# Patient Record
Sex: MALE | Race: WHITE | NOT HISPANIC OR LATINO | Employment: FULL TIME | URBAN - METROPOLITAN AREA
[De-identification: names, ages, dates, MRNs, and addresses within clinical notes are randomized per-mention and may not be internally consistent; named-entity substitution may affect disease eponyms.]

---

## 2019-01-24 ENCOUNTER — HISTORICAL (OUTPATIENT)
Dept: ADMINISTRATIVE | Facility: HOSPITAL | Age: 39
End: 2019-01-24

## 2019-01-24 LAB
ABS NEUT (OLG): 4.2 X10(3)/MCL (ref 2.1–9.2)
ALBUMIN SERPL-MCNC: 4.7 GM/DL (ref 3.4–5)
ALBUMIN/GLOB SERPL: 1.42 {RATIO} (ref 1.5–2.5)
ALP SERPL-CCNC: 76 UNIT/L (ref 38–126)
ALT SERPL-CCNC: 38 UNIT/L (ref 7–52)
AST SERPL-CCNC: 25 UNIT/L (ref 15–37)
BILIRUB SERPL-MCNC: 0.5 MG/DL (ref 0.2–1)
BILIRUBIN DIRECT+TOT PNL SERPL-MCNC: 0.1 MG/DL (ref 0–0.5)
BILIRUBIN DIRECT+TOT PNL SERPL-MCNC: 0.4 MG/DL
BUN SERPL-MCNC: 13 MG/DL (ref 7–18)
CALCIUM SERPL-MCNC: 9.4 MG/DL (ref 8.5–10)
CHLORIDE SERPL-SCNC: 102 MMOL/L (ref 98–107)
CHOLEST SERPL-MCNC: 280 MG/DL (ref 0–200)
CHOLEST/HDLC SERPL: 5.4 {RATIO}
CO2 SERPL-SCNC: 32 MMOL/L (ref 21–32)
CREAT SERPL-MCNC: 0.69 MG/DL (ref 0.6–1.3)
ERYTHROCYTE [DISTWIDTH] IN BLOOD BY AUTOMATED COUNT: 12 % (ref 11.5–17)
GLOBULIN SER-MCNC: 3.3 GM/DL (ref 1.2–3)
GLUCOSE SERPL-MCNC: 96 MG/DL (ref 74–106)
HCT VFR BLD AUTO: 47 % (ref 42–52)
HDLC SERPL-MCNC: 52 MG/DL (ref 35–60)
HGB BLD-MCNC: 15.4 GM/DL (ref 14–18)
LDLC SERPL CALC-MCNC: 195 MG/DL (ref 0–129)
LYMPHOCYTES # BLD AUTO: 1.9 X10(3)/MCL (ref 0.6–3.4)
LYMPHOCYTES NFR BLD AUTO: 28.5 % (ref 13–40)
MCH RBC QN AUTO: 30.4 PG (ref 27–31.2)
MCHC RBC AUTO-ENTMCNC: 33 GM/DL (ref 32–36)
MCV RBC AUTO: 93 FL (ref 80–94)
MONOCYTES # BLD AUTO: 0.5 X10(3)/MCL (ref 0.1–1.3)
MONOCYTES NFR BLD AUTO: 7.4 % (ref 0.1–24)
NEUTROPHILS NFR BLD AUTO: 64.1 % (ref 47–80)
PLATELET # BLD AUTO: 194 X10(3)/MCL (ref 130–400)
PMV BLD AUTO: 9.4 FL (ref 9.4–12.4)
POTASSIUM SERPL-SCNC: 4.2 MMOL/L (ref 3.5–5.1)
PROT SERPL-MCNC: 8 GM/DL (ref 6.4–8.2)
RBC # BLD AUTO: 5.07 X10(6)/MCL (ref 4.7–6.1)
SODIUM SERPL-SCNC: 137 MMOL/L (ref 136–145)
TRIGL SERPL-MCNC: 188 MG/DL (ref 30–150)
TSH SERPL-ACNC: 1.12 MIU/ML (ref 0.35–4.94)
VLDLC SERPL CALC-MCNC: 37.6 MG/DL
WBC # SPEC AUTO: 6.6 X10(3)/MCL (ref 4.5–11.5)

## 2022-04-09 ENCOUNTER — HISTORICAL (OUTPATIENT)
Dept: ADMINISTRATIVE | Facility: HOSPITAL | Age: 42
End: 2022-04-09

## 2022-04-25 VITALS
HEIGHT: 65 IN | OXYGEN SATURATION: 98 % | DIASTOLIC BLOOD PRESSURE: 82 MMHG | BODY MASS INDEX: 26.81 KG/M2 | WEIGHT: 160.94 LBS | SYSTOLIC BLOOD PRESSURE: 126 MMHG

## 2022-05-02 NOTE — HISTORICAL [facility] CERNER
This is a historical note converted from [employer]. Formatting and pictures may have been removed.  Please reference [facility] for original formatting and attached multimedia. Chief Complaint  cpx fasting  History of Present Illness  39?year old male presents for wellness visit.  Blood Pressure - 144/90 repeat /84  BMI - 28.18  Immunizations - Tetanus 2015  Diet - Balanced  Exercise - Minimal  Intermittent GERD controlled with Tums or Prilosec  Pt reports intermittent lower chest wall pain described as a soreness.? Last from 1 day to 1 week.? Denies any associated SOB, palpitations, dysphasia, nausea, diaphoresis.? Typically occurs on weekends morning after?a night that he had been drinking.? Denies being triggered by any particular activity.?  Review of Systems  Constitutional:?No weight loss, no fever, no fatigue, no chills, no night sweats,?no weakness  Eyes:?No blurred vision,?no redness,?no drainage,?no ocular?pain  HEENT:?No sore throat,?no ear pain, no sinus pressure, no nasal congestion, no rhinorrhea, no postnasal drip  Respiratory:?No cough, no wheezing, no sputum production, no shortness of breath  Cardiovascular:?No chest pain, no palpitations, no dyspnea on exertion,?no orthopnea  Gastrointestinal:?No nausea, no vomiting, no abdominal pain, no diarrhea,?no constipation, no melena,?no hematochezia  Genitourinary:?No dysuria, no hematuria, no frequency, no urgency, no incontinence, no discharge  Musculoskeletal:?No myalgias, no arthralgias, no weakness, no joint effusion, no edema  Integumentary:?No rashes, no hives, no itching, no lesions, no jaundice  Neurologic:?No headaches, no numbness, no tingling, no weakness, no dizziness  Psychiatric:?No anxiety, no irritability, no depression,?no suicidal ideations, no?homicidal ideations,?no delusions, no hallucinations  Endocrine:?No polyuria, no polydipsia, no polyphagia  Hematology:?No bruising, no lymphadenopathy,?no paleness  ?  Physical Exam  Vitals &  Measurements  HR:?70(Peripheral)? BP:?125/84?  HT:?165.1?cm? WT:?76.8?kg? BMI:?28.18?  General:?Well developed, Well-nourished, in No acute distress, A&O x 4  Eye:?PERRLA, EOMI, Clear conjunctiva, Eyelids unremarkable  Ears:?Bilateral EAC clear?and?Bilateral TM clear  Nose:? Mucosa normal, No rhinorrhea, No lesions  O/P:??No?erythema,?No tonsillar hypertrophy,?No tonsillar exudates,?No cobblestoning  Neck:?Soft, Nontender, No thyromegaly, Full range of motion, No cervical lymphadenopathy, No JVD  Cardiovascular:?Regular rate and rhythm, No murmurs, No gallops, No rubs  Respiratory:?Clear to auscultation bilaterally, No wheezes, No rhonchi, No?crackles  Abdomen:? Soft, Nontender, No hepatosplenomegaly, Normal and equal bowel sounds, No masses, No rebound, No guarding  Musculoskeletal:? No tenderness, FROM, No joint abnormality, No clubbing, No cyanosis,No?edema  Neurologic:? No motor or sensory deficits, Reflexes 2+ throughout, CN II-XII grossly intact  Integumentary:?No rashes or skin lesions  ?  Assessment/Plan  1.?Wellness examination?Z00.00  ?CBC, CMP, FLP, TSH today  Discussed importance of maintaining a balanced diet and regular exercise  Ordered:  Preventative Health Care Est 18-39 years [id number] , Wellness examination, [facility], 01/24/19 11:04:00 CST  ?  2.?GERD - Gastro-esophageal reflux disease?K21.9  ?Continue diet and lifestyle modification  Recommend Pepcid Complete as needed  ?  3.?Chest wall pain?R07.89  ?EKG-normal sinus rhythm without any ST or T wave changes  Attempt identified avoid triggers if?possible  If persists or worsens recommend further evaluation  ?   Problem List/Past Medical History  Ongoing  GERD - Gastro-esophageal reflux disease  Historical  Childhood asthma  Procedure/Surgical History  Multiple Reconstructive Surgeries Cleft Lift and Palate   Medications  No active medications  Allergies  Septra?(UNKNOWN)  Social History  Alcohol  Current, 1-2 times per week,  01/24/2019  Employment/School  Employed, Work/School description: [employer] 01/24/2019  Home/Environment  Lives with Children, Spouse., 01/24/2019  Nutrition/Health  Type of diet: Balanced. Regular, 01/24/2019  Substance Abuse  Never, 01/24/2019  Tobacco  4 or less cigarettes(less than 1/4 pack)/day in last 30 days, No, 01/24/2019  Family History  Asthma.: Son.  Hypertension.: Father.  Immunizations  Vaccine Date Status   hepatitis B adult vaccine 01/26/2007 Recorded   hepatitis A adult vaccine 01/26/2007 Recorded   influenza virus vaccine, inactivated 01/26/2007 Recorded   measles/mumps/rubella virus vaccine 06/01/1982 Recorded   Health Maintenance  Health Maintenance  ???Pending?(in the next year)  ??? ??Due?  ??? ? ? ?ADL Screening due??01/24/19??and every 1??year(s)  ??? ? ? ?Alcohol Misuse Screening due??01/24/19??and every 1??year(s)  ??? ? ? ?Depression Screening due??01/24/19??and every?  ??? ? ? ?Tetanus Vaccine due??01/24/19??and every 10??year(s)  ???Satisfied?(in the past 1 year)  ??? ??Satisfied?  ??? ? ? ?Blood Pressure Screening on??01/24/19.??Satisfied by Seb Lauren Jr, MD  ??? ? ? ?Body Mass Index Check on??01/24/19.??Satisfied by Kaylee Good LPN  ??? ? ? ?Influenza Vaccine on??01/24/19.??Satisfied by Kaylee Good LPN  ??? ? ? ?Lipid Screening on??01/24/19.??Satisfied by Zana Khoury  ??? ? ? ?Obesity Screening on??01/24/19.??Satisfied by Kaylee Good LPN  ?  ?

## 2022-05-20 PROBLEM — M50.30 DEGENERATION OF INTERVERTEBRAL DISC OF CERVICAL REGION: Status: ACTIVE | Noted: 2022-05-20

## 2022-05-20 PROBLEM — F10.10 ALCOHOL ABUSE: Status: ACTIVE | Noted: 2022-05-20

## 2022-05-20 PROBLEM — E78.00 HYPERCHOLESTEROLEMIA: Status: ACTIVE | Noted: 2022-05-20

## 2022-05-20 PROBLEM — L73.8 SEBACEOUS GLAND HYPERPLASIA: Status: ACTIVE | Noted: 2022-05-20

## 2023-01-30 PROCEDURE — 87088 URINE BACTERIA CULTURE: CPT | Performed by: FAMILY MEDICINE

## 2023-01-30 PROCEDURE — 87186 SC STD MICRODIL/AGAR DIL: CPT | Performed by: FAMILY MEDICINE

## 2023-05-23 PROBLEM — F10.10 ALCOHOL ABUSE: Status: RESOLVED | Noted: 2022-05-20 | Resolved: 2023-05-23
